# Patient Record
Sex: FEMALE | Race: WHITE
[De-identification: names, ages, dates, MRNs, and addresses within clinical notes are randomized per-mention and may not be internally consistent; named-entity substitution may affect disease eponyms.]

---

## 2017-02-17 ENCOUNTER — HOSPITAL ENCOUNTER (EMERGENCY)
Age: 22
Discharge: HOME | End: 2017-02-17
Payer: COMMERCIAL

## 2017-02-17 DIAGNOSIS — R09.81: ICD-10-CM

## 2017-02-17 DIAGNOSIS — H66.92: Primary | ICD-10-CM

## 2017-02-17 PROCEDURE — 99283 EMERGENCY DEPT VISIT LOW MDM: CPT

## 2017-02-17 PROCEDURE — 99284 EMERGENCY DEPT VISIT MOD MDM: CPT

## 2019-07-17 ENCOUNTER — HOSPITAL ENCOUNTER (EMERGENCY)
Dept: HOSPITAL 76 - ED | Age: 24
LOS: 1 days | Discharge: HOME | End: 2019-07-18
Payer: COMMERCIAL

## 2019-07-17 DIAGNOSIS — F17.200: ICD-10-CM

## 2019-07-17 DIAGNOSIS — O03.1: Primary | ICD-10-CM

## 2019-07-17 DIAGNOSIS — Z98.890: ICD-10-CM

## 2019-07-17 LAB
ALBUMIN DIAFP-MCNC: 4.1 G/DL (ref 3.2–5.5)
ALBUMIN/GLOB SERPL: 1.3 {RATIO} (ref 1–2.2)
ALP SERPL-CCNC: 57 IU/L (ref 42–121)
ALT SERPL W P-5'-P-CCNC: 19 IU/L (ref 10–60)
ANION GAP SERPL CALCULATED.4IONS-SCNC: 12 MMOL/L (ref 6–13)
AST SERPL W P-5'-P-CCNC: 16 IU/L (ref 10–42)
BASOPHILS NFR BLD AUTO: 0.1 10^3/UL (ref 0–0.1)
BASOPHILS NFR BLD AUTO: 0.7 %
BILIRUB BLD-MCNC: 1.4 MG/DL (ref 0.2–1)
BILIRUB UR QL CFM: NEGATIVE
BUN SERPL-MCNC: 16 MG/DL (ref 6–20)
CALCIUM UR-MCNC: 9 MG/DL (ref 8.5–10.3)
CHLORIDE SERPL-SCNC: 106 MMOL/L (ref 101–111)
CLARITY UR REFRACT.AUTO: (no result)
CO2 SERPL-SCNC: 22 MMOL/L (ref 21–32)
CREAT SERPLBLD-SCNC: 0.6 MG/DL (ref 0.4–1)
EOSINOPHIL # BLD AUTO: 0.2 10^3/UL (ref 0–0.7)
EOSINOPHIL NFR BLD AUTO: 1.4 %
ERYTHROCYTE [DISTWIDTH] IN BLOOD BY AUTOMATED COUNT: 14.7 % (ref 12–15)
GFRSERPLBLD MDRD-ARVRAT: 123 ML/MIN/{1.73_M2} (ref 89–?)
GLOBULIN SER-MCNC: 3.2 G/DL (ref 2.1–4.2)
GLUCOSE SERPL-MCNC: 87 MG/DL (ref 70–100)
GLUCOSE UR QL STRIP.AUTO: NEGATIVE MG/DL
HGB UR QL STRIP: 10.7 G/DL (ref 12–16)
KETONES UR QL STRIP.AUTO: 40 MG/DL
LIPASE SERPL-CCNC: 28 U/L (ref 22–51)
LYMPHOCYTES # SPEC AUTO: 2.4 10^3/UL (ref 1.5–3.5)
LYMPHOCYTES NFR BLD AUTO: 22.5 %
MCH RBC QN AUTO: 27.7 PG (ref 27–31)
MCHC RBC AUTO-ENTMCNC: 31.7 G/DL (ref 32–36)
MCV RBC AUTO: 87.6 FL (ref 81–99)
MONOCYTES # BLD AUTO: 0.6 10^3/UL (ref 0–1)
MONOCYTES NFR BLD AUTO: 6 %
NEUTROPHILS # BLD AUTO: 7.3 10^3/UL (ref 1.5–6.6)
NEUTROPHILS # SNV AUTO: 10.6 X10^3/UL (ref 4.8–10.8)
NEUTROPHILS NFR BLD AUTO: 69 %
NITRITE UR QL STRIP.AUTO: NEGATIVE
PDW BLD AUTO: 8.4 FL (ref 7.9–10.8)
PH UR STRIP.AUTO: 5.5 PH (ref 5–7.5)
PLATELET # BLD: 456 10^3/UL (ref 130–450)
PROT SPEC-MCNC: 7.3 G/DL (ref 6.7–8.2)
PROT UR STRIP.AUTO-MCNC: 100 MG/DL
RBC # UR STRIP.AUTO: (no result) /UL
RBC # URNS HPF: (no result) /HPF (ref 0–5)
RBC MAR: 3.86 10^6/UL (ref 4.2–5.4)
SODIUM SERPLBLD-SCNC: 140 MMOL/L (ref 135–145)
SP GR UR STRIP.AUTO: >=1.03 (ref 1–1.03)
SQUAMOUS URNS QL MICRO: (no result)
UROBILINOGEN UR QL STRIP.AUTO: (no result) E.U./DL
UROBILINOGEN UR STRIP.AUTO-MCNC: NEGATIVE MG/DL

## 2019-07-17 PROCEDURE — 85025 COMPLETE CBC W/AUTO DIFF WBC: CPT

## 2019-07-17 PROCEDURE — 99284 EMERGENCY DEPT VISIT MOD MDM: CPT

## 2019-07-17 PROCEDURE — 86901 BLOOD TYPING SEROLOGIC RH(D): CPT

## 2019-07-17 PROCEDURE — 84702 CHORIONIC GONADOTROPIN TEST: CPT

## 2019-07-17 PROCEDURE — 83690 ASSAY OF LIPASE: CPT

## 2019-07-17 PROCEDURE — 87086 URINE CULTURE/COLONY COUNT: CPT

## 2019-07-17 PROCEDURE — 76830 TRANSVAGINAL US NON-OB: CPT

## 2019-07-17 PROCEDURE — 36415 COLL VENOUS BLD VENIPUNCTURE: CPT

## 2019-07-17 PROCEDURE — 76856 US EXAM PELVIC COMPLETE: CPT

## 2019-07-17 PROCEDURE — 86900 BLOOD TYPING SEROLOGIC ABO: CPT

## 2019-07-17 PROCEDURE — 86850 RBC ANTIBODY SCREEN: CPT

## 2019-07-17 PROCEDURE — 80053 COMPREHEN METABOLIC PANEL: CPT

## 2019-07-17 PROCEDURE — 99283 EMERGENCY DEPT VISIT LOW MDM: CPT

## 2019-07-17 PROCEDURE — 81001 URINALYSIS AUTO W/SCOPE: CPT

## 2019-07-17 PROCEDURE — 81003 URINALYSIS AUTO W/O SCOPE: CPT

## 2019-07-18 VITALS — SYSTOLIC BLOOD PRESSURE: 101 MMHG | DIASTOLIC BLOOD PRESSURE: 71 MMHG

## 2019-07-18 NOTE — ULTRASOUND REPORT
Reason:  eval for RPC

Procedure Date:  07/17/2019   

Accession Number:  [id number] / [id number]                    

Procedure:  US  - Pelvic w/Transvaginal CPT Code:  

 

FULL RESULT:

 

 

EXAM:

PELVIC ULTRASOUND

 

EXAM DATE: 7/17/2019 11:40 PM.

 

CLINICAL HISTORY: D and C one week ago, persistent bleeding, question 

retained products

 

COMPARISON: None.

 

TECHNIQUE: Realtime transabdominal pelvic scan performed to identify the 

uterus and adnexa and as an overview of other pelvic structures, followed 

by transvaginal scan to provide greater detail of the uterus and adnexa, 

with static image documentation.

 

FINDINGS:

Uterus: 7.6 x 5.6 x 4.4 cm, volume 97 cc. Retroverted position. Normal 

overall size and echotexture.

Masses: None.

Endometrium: Focal thickening up to 18 mm. Vascularity in the region of 

focal thickening in the fundal myometrium, compatible with retained 

products.

Cervix: Unremarkable.

 

Right Ovary: 2.7 x 1.8 x 1.7 cm, volume 4 cc. Normal echotexture and 

blood flow.

Left Ovary: 2.5 x 1.4 x 1.3 cm, volume 2 cc. Normal echotexture and blood 

flow.

 

Free Fluid: None.

 

Other: None.

IMPRESSION: Focal thickening in the fundal endometrium, up to 18 mm, with 

associated vascularity, compatible with retained products of conception.

 

[facility]

## 2019-07-18 NOTE — ED PHYSICIAN DOCUMENTATION
PD HPI FEMALE 





- Stated complaint


Stated Complaint: FEM /BLEEDING POST OP





- Chief complaint


Chief Complaint: Abd Pain





- History obtained from


History obtained from: Patient





- History of Present Illness


Timing - onset: Today


Timing - duration: Hours


Timing - details: Abrupt onset, Still present


Associated symptoms: Pelvic pain, Vaginal bleeding


Contributing factors: Other (D&C on 19)


Similar symptoms before: Has not had sx before


Recently seen: Surgery





- Additional information


Additional information: 





24-year-old female has had a D&C done after a miscarriage on .  She 

states that initially the bleeding was well-controlled and stopped.  Her 

cramping was minimal.  She is subsequently developed significant bleeding 

passing clots that are silver dollar sized and a lot of cramping.





Review of Systems


Constitutional: denies: Fever


Eyes: denies: Decreased vision


Ears: denies: Ear pain


Nose: denies: Rhinorrhea / runny nose, Congestion


Throat: denies: Sore throat


Cardiac: denies: Chest pain / pressure


Respiratory: denies: Dyspnea, Cough


GI: reports: Abdominal Pain.  denies: Nausea, Vomiting


: reports: Vaginal bleeding, Other (pelvic cramping).  denies: Dysuria, 

Frequency





PD PAST MEDICAL HISTORY





- Past Medical History


Cardiovascular: None


Respiratory: None


Neuro: None


Endocrine/Autoimmune: None


GI: None


GYN: Miscarriage(s), Other


: Other


HEENT: None


Psych: Anxiety


Musculoskeletal: None


Derm: None


Other Past Medical History: ;





- Past Surgical History


Past Surgical History: Yes





- Present Medications


Home Medications: 


                                Ambulatory Orders











 Medication  Instructions  Recorded  Confirmed


 


Azithromycin [Zithromax] 250 mg PO DAILY #6 tablet 17 


 


Norethindrone-Ethinyl Estrad 0 mg PO DAILY 17





[Necon 0.5-35-28 Tablet]   














- Allergies


Allergies/Adverse Reactions: 


                                    Allergies











Allergy/AdvReac Type Severity Reaction Status Date / Time


 


No Known Drug Allergies Allergy   Verified 19 21:25














- Social History


Does the pt smoke?: No


Smoking Status: Current every day smoker


Does the pt drink ETOH?: Yes


Does the pt have substance abuse?: No


Substance Use and Type: Marijuana





- Immunizations


Immunizations are current?: Yes





- POLST


Patient has POLST: No





PD ED PE NORMAL





- Vitals


Vital signs reviewed: Yes (tachy )





- General


General: Alert and oriented X 3, No acute distress, Well developed/nourished





- HEENT


HEENT: Atraumatic, PERRL, EOMI





- Neck


Neck: Supple, no meningeal sign, No bony TTP





- Cardiac


Cardiac: RRR, No murmur





- Respiratory


Respiratory: No respiratory distress, Clear bilaterally





- Abdomen


Abdomen: Normal bowel sounds, Soft, Non tender, Non distended, No organomegaly





- Back


Back: No CVA TTP, No spinal TTP





- Derm


Derm: Normal color, Warm and dry, No rash





- Extremities


Extremities: No deformity, No edema





- Neuro


Neuro: Alert and oriented X 3, CNs 2-12 intact, No motor deficit, No sensory 

deficit, Normal speech


Eye Opening: Spontaneous


Motor: Obeys Commands


Verbal: Oriented


GCS Score: 15





- Psych


Psych: Normal mood, Normal affect





Results





- Vitals


Vitals: 


                               Vital Signs - 24 hr











  19





  21:21 01:33 02:30


 


Temperature 36.6 C 36.9 C 


 


Heart Rate 109 H 93 83


 


Respiratory 17  16





Rate   


 


Blood Pressure 111/73 111/66 101/71


 


O2 Saturation 99 100 98








                                     Oxygen











O2 Source                      Room air

















- Labs


Labs: 


                                Laboratory Tests











  19





  21:51 21:51 21:51


 


WBC  10.6  


 


RBC  3.86 L  


 


Hgb  10.7 L  


 


Hct  33.8 L  


 


MCV  87.6  


 


MCH  27.7  


 


MCHC  31.7 L  


 


RDW  14.7  


 


Plt Count  456 H  


 


MPV  8.4  


 


Neut # (Auto)  7.3 H  


 


Lymph # (Auto)  2.4  


 


Mono # (Auto)  0.6  


 


Eos # (Auto)  0.2  


 


Baso # (Auto)  0.1  


 


Absolute Nucleated RBC  0.00  


 


Nucleated RBC %  0.0  


 


Sodium   140 


 


Potassium   3.7 


 


Chloride   106 


 


Carbon Dioxide   22 


 


Anion Gap   12.0 


 


BUN   16 


 


Creatinine   0.6 


 


Estimated GFR (MDRD)   123 


 


Glucose   87 


 


Calcium   9.0 


 


Total Bilirubin   1.4 H 


 


AST   16 


 


ALT   19 


 


Alkaline Phosphatase   57 


 


Total Protein   7.3 


 


Albumin   4.1 


 


Globulin   3.2 


 


Albumin/Globulin Ratio   1.3 


 


Lipase   28 


 


HCG, Quant   


 


Urine Color   


 


Urine Clarity   


 


Urine pH   


 


Ur Specific Gravity   


 


Urine Protein   


 


Urine Glucose (UA)   


 


Urine Ketones   


 


Urine Occult Blood   


 


Urine Nitrite   


 


Urine Bilirubin   


 


Urine Urobilinogen   


 


Ur Leukocyte Esterase   


 


Urine RBC   


 


Urine WBC   


 


Ur Squamous Epith Cells   


 


Urine Bacteria   


 


Ur Microscopic Review   


 


Urine Culture Comments   


 


Blood Type    A POSITIVE


 


Antibody Screen    NEGATIVE














  19





  21:51 22:00


 


WBC  


 


RBC  


 


Hgb  


 


Hct  


 


MCV  


 


MCH  


 


MCHC  


 


RDW  


 


Plt Count  


 


MPV  


 


Neut # (Auto)  


 


Lymph # (Auto)  


 


Mono # (Auto)  


 


Eos # (Auto)  


 


Baso # (Auto)  


 


Absolute Nucleated RBC  


 


Nucleated RBC %  


 


Sodium  


 


Potassium  


 


Chloride  


 


Carbon Dioxide  


 


Anion Gap  


 


BUN  


 


Creatinine  


 


Estimated GFR (MDRD)  


 


Glucose  


 


Calcium  


 


Total Bilirubin  


 


AST  


 


ALT  


 


Alkaline Phosphatase  


 


Total Protein  


 


Albumin  


 


Globulin  


 


Albumin/Globulin Ratio  


 


Lipase  


 


HCG, Quant  200.26 


 


Urine Color   YELLOW


 


Urine Clarity   BLOODY


 


Urine pH   5.5


 


Ur Specific Gravity   >=1.030 H


 


Urine Protein   100 H


 


Urine Glucose (UA)   NEGATIVE


 


Urine Ketones   40 H


 


Urine Occult Blood   LARGE H


 


Urine Nitrite   NEGATIVE


 


Urine Bilirubin   NEGATIVE


 


Urine Urobilinogen   0.2 (NORMAL)


 


Ur Leukocyte Esterase   TRACE H


 


Urine RBC   TNTC H


 


Urine WBC   0-3


 


Ur Squamous Epith Cells   FEW Squamous


 


Urine Bacteria   Few


 


Ur Microscopic Review   INDICATED


 


Urine Culture Comments   INDICATED


 


Blood Type  


 


Antibody Screen  














- Rads (name of study)


  ** pelvic u/s


Radiology: Prelim report reviewed (Impression: Focal thickening in the fundal 

endometrium, up to 18 mm, with associated vascularity, compatible with retained 

products of conception.), EMP read indepedently, See rad report





PD MEDICAL DECISION MAKING





- ED course


Complexity details: reviewed results, re-evaluated patient, considered 

differential, d/w patient


ED course: 





24-year-old female with a miscarriage at 6 weeks has had a D&C and she is now 1 

week out from her procedure and is developed significant bleeding.  Here in the 

emergency department her bleeding has slowed down her cramping has slowed down 

and the ultrasound read shows potential products of retained conception  .  Dr. ash is consulted in the case and recommends the patient follow-up in his 

office on Friday and follow-up today if she has further excessive bleeding.





Departure





- Departure


Disposition: 01 Home, Self Care


Condition: Stable


Instructions:  Dilation and Curettage


Follow-Up: 


[facility] [Provider Group]


Comments: 


Follow-up on 2019 with [facility].  If you have excessive 

bleeding today follow-up with him today for follow-up here.


Discharge Date/Time: 19 02:40

## 2020-01-27 ENCOUNTER — HOSPITAL ENCOUNTER (EMERGENCY)
Age: 25
Discharge: HOME | End: 2020-01-27
Payer: SELF-PAY

## 2020-01-27 VITALS
DIASTOLIC BLOOD PRESSURE: 81 MMHG | RESPIRATION RATE: 18 BRPM | HEART RATE: 97 BPM | OXYGEN SATURATION: 100 % | TEMPERATURE: 98.42 F | SYSTOLIC BLOOD PRESSURE: 121 MMHG

## 2020-01-27 VITALS — SYSTOLIC BLOOD PRESSURE: 123 MMHG | DIASTOLIC BLOOD PRESSURE: 80 MMHG | HEART RATE: 91 BPM
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
## 2020-01-27 DIAGNOSIS — H11.32: Primary | ICD-10-CM

## 2020-01-27 DIAGNOSIS — S05.12XA: ICD-10-CM

## 2020-01-27 PROCEDURE — 99283 EMERGENCY DEPT VISIT LOW MDM: CPT

## 2020-01-27 NOTE — ED.EYEPROB
"HPI - Eye Problem
<ENIO Strange - Last Filed: 01/28/20 00:41>
General
Chief complaint: Eye Problems
Stated complaint: hit eye on face, couldn't see, bleeding
Time Seen by Provider: 01/27/20 16:58
Source: patient
Mode of arrival: Ambulatory
Limitations: no limitations
History of Present Illness
HPI Narrative: Is a 24-year-old female, smoker, who presents to ED with friends with chief complain of left eye injury.  Patient states she accidentally ran into the car door and hit her left eye.  Patient denies vision change but noticed redness 
and  bleeding  behind the lower eyelid.  Patient reports  could not see for the 1st 10-20 seconds  immediately after the direct trauma to the affected eye but this has resolved and denies visual changes or decreased vision.  Patient feels some 
irritation on affected eye when she moves her eyes.  Patient states her tetanus immunization is up to date at this time.

Review of Systems
<ENIO Strange - Last Filed: 01/28/20 00:41>
Review of Systems
Narrative: General: Denies fever, chills, fatigue, malaise, sweats.

HEENT:  See HPI

Respiratory: Denies dyspnea, cough, wheezing, hemoptysis, sputum.

Cardiovascular: Denies chest pain, palpitations, orthopnea, edema.

Gastrointestinal: Denies nausea, vomiting, abdominal pain, diarrhea, constipation, melena.

: Denies dysuria, frequency, incontinence, hematuria, urinary retention.

Musculoskeletal: Denies weakness, joint pain or bony pain.

Skin: Denies rash, skin lesions, or other.

Neurologic: Denies weakness, headache, numbness, change in speech, confusion, seizures, incoordination.

Psychiatric: No concerning psychosocial issues.

12-point review of systems is negative except for those stated above.


Patient History
<ENIO Strange - Last Filed: 01/28/20 00:41>
Medical History (Reviewed 01/27/20 @ 17:29 by ENIO Strange)

No significant past medical history (Acute)


Surgical History (Reviewed 01/27/20 @ 17:29 by ENIO Strange)

No pertinent past surgical history (Acute)


Social History (Reviewed 01/27/20 @ 17:29 by ENIO Strange)
Smoking Status:  Current every day smoker 


Smoking Status: Current every day smoker

Exam
<Pk ENIO Lacy - Last Filed: 01/28/20 00:41>
Narrative
Exam Narrative: General appearance: well developed, well nourished, in no acute distress.

Head: normocephalic, atraumatic, no scalp lesions, non-tender.

ENT:  Hearing grossly intact.  Nose without bleeding, purulent discharge, septal hematoma or deviation.  Turbinate without erythema or swelling.  Facial sinuses nontender to palpate.  Mucous membrane moist, no mucosal lesion.  Throat without 
erythema, tonsillar hypertrophy or exudate.  Uvula in midline, airway patent.  PERRAL, EOM intact, + subconjunctival hemorrhage on left lower eye.  Peripheral vision is intact and is able to read fingers held in front of her.  IOP with tonopen 
average 12.  No uptake noted with fluorescein in Left conjunctiva.  No hyphema noted.

Neck/Thyroid: neck supple, full range of motion, no visible masses or meningeal signs. No JVD, non-tender without lymphadenopathy. 

Skin: about 1-2 mm superfical abrasion was noted in Left inner corner and lateral eye.  no suspicious rashes, lesions over visible areas.  Warm and dry and appropriate color for ethnicity.

Heart: no clubbing, no cyanosis, no edema. S1 and S2 normal.  RRR w/o murmurs, clicks, or bruits.

Lungs: Breathing even and unlabored.  No stridor.  No accessory muscles used.  Able to speak in full sentences. 

Chest: normal shape and expansion.

Abdomen: non-obese, non-distended.

Neurologic: alert and oriented.  Cognitive exam, CNS and PNS grossly intact on informal exam.

Psych:  good eye contact, normal affect.  

Initial Vital Signs
Initial Vital Signs: Vital Signs

Temperature  98.5 F   01/27/20 14:59
Pulse Rate  97 H  01/27/20 14:59
Respiratory Rate  18   01/27/20 14:59
Blood Press
[id number]|2020-01-27 23:59:52|2020-01-27 23:59:52|ED.FEVER||||"HPI - Fever

## 2020-05-20 ENCOUNTER — HOSPITAL ENCOUNTER (OUTPATIENT)
Dept: HOSPITAL 76 - DI | Age: 25
Discharge: HOME | End: 2020-05-20
Attending: ADVANCED PRACTICE MIDWIFE
Payer: MEDICAID

## 2020-05-20 DIAGNOSIS — Z32.01: ICD-10-CM

## 2020-05-20 DIAGNOSIS — Z36.89: Primary | ICD-10-CM

## 2020-05-20 PROCEDURE — 76817 TRANSVAGINAL US OBSTETRIC: CPT

## 2020-05-20 PROCEDURE — 76801 OB US < 14 WKS SINGLE FETUS: CPT

## 2020-05-21 ENCOUNTER — HOSPITAL ENCOUNTER (OUTPATIENT)
Dept: HOSPITAL 76 - LAB.R | Age: 25
Discharge: HOME | End: 2020-05-21
Attending: ADVANCED PRACTICE MIDWIFE
Payer: MEDICAID

## 2020-05-21 DIAGNOSIS — Z34.90: Primary | ICD-10-CM

## 2020-05-21 LAB
AMPHET UR QL SCN: NEGATIVE
BENZODIAZ UR QL SCN: NEGATIVE
CLARITY UR REFRACT.AUTO: CLEAR
COCAINE UR-SCNC: NEGATIVE UMOL/L
GLUCOSE UR QL STRIP.AUTO: NEGATIVE MG/DL
KETONES UR QL STRIP.AUTO: NEGATIVE MG/DL
METHADONE UR QL SCN: NEGATIVE
METHAMPHET UR QL SCN: NEGATIVE
NITRITE UR QL STRIP.AUTO: NEGATIVE
OPIATES UR QL SCN: NEGATIVE
PH UR STRIP.AUTO: 7 PH (ref 5–7.5)
PROT UR STRIP.AUTO-MCNC: NEGATIVE MG/DL
RBC # UR STRIP.AUTO: NEGATIVE /UL
SP GR UR STRIP.AUTO: 1.02 (ref 1–1.03)
SQUAMOUS URNS QL MICRO: (no result)
UROBILINOGEN UR QL STRIP.AUTO: (no result) E.U./DL
UROBILINOGEN UR STRIP.AUTO-MCNC: NEGATIVE MG/DL
VOLATILE DRUGS POS SERPL SCN: (no result)

## 2020-05-21 PROCEDURE — 80306 DRUG TEST PRSMV INSTRMNT: CPT

## 2020-05-21 PROCEDURE — 81003 URINALYSIS AUTO W/O SCOPE: CPT

## 2020-05-21 PROCEDURE — 87086 URINE CULTURE/COLONY COUNT: CPT

## 2020-05-21 PROCEDURE — 81001 URINALYSIS AUTO W/SCOPE: CPT

## 2020-05-21 NOTE — ULTRASOUND REPORT
Reason:  PREGNANCY TEST POSTIVE

Procedure Date:  05/20/2020   

Accession Number:  [id number] / [id number]                    

Procedure:  US  - OB First Trimester CPT Code:  

 

***Final Report***

 

 

FULL RESULT:

 

 

EXAM:

FIRST TRIMESTER OBSTETRIC ULTRASOUND

(Less than 11 weeks)

 

EXAM DATE: 5/20/2020 07:07 PM.

 

CLINICAL HISTORY: PREGNANCY TEST POSITIVE.

LMP: Unknown.

 

COMPARISONS: None.

 

TECHNIQUE: Transabdominal and transvaginal ultrasound examination with 

static image documentation.

 

CLINICAL DATES:

EGA unknown.

 

ASSESSMENT:

Gestational Sac: Single intrauterine.  Mean gestational sac diameter: 38 

mm = 9 weeks 1 day.

Embryo: CRL (crown-rump length) 22 mm = 8 weeks 6 days.

Cardiac activity: 185 beats per minute.

Yolk sac: 4 mm.

Amniotic fluid: Not accurately assessed at this gestational age.

Early placenta: Not visible at this gestational age.

Other: There is a heterogeneous perigestational fluid collection 

measuring 3.7 x 0.7 cm.

 

MATERNAL STRUCTURES:

Uterus: Anteverted. Unremarkable.

Cervix: Closed.

Right Ovary/Adnexa: The ovary measures 2.6 x 1.7 x 2.4 cm, volume 5.5 cc. 

Unremarkable.

Left Ovary/Adnexa: The ovary measures 2.3 x 1.5 x 2.0 cm, volume 3.8 cc.  

Corpus luteum is present.

Free Fluid: None.

 

Other: None.

IMPRESSION:

1. Single viable intrauterine pregnancy at EGA 8 weeks 6 days with MICHAEL 

12/24/2020 based on crown-rump length.

2. Assigned dating is MICHAEL 12/24/2020 based on the current ultrasound.

3. There is a heterogeneous perigestational fluid collection measuring 

3.7 x 0.7 cm.

 

[facility]

## 2020-06-02 ENCOUNTER — HOSPITAL ENCOUNTER (OUTPATIENT)
Age: 25
End: 2020-06-02
Payer: COMMERCIAL

## 2020-06-02 DIAGNOSIS — Z34.01: Primary | ICD-10-CM

## 2020-06-02 DIAGNOSIS — Z3A.11: ICD-10-CM

## 2020-06-02 PROCEDURE — 76801 OB US < 14 WKS SINGLE FETUS: CPT

## 2020-06-02 PROCEDURE — 76817 TRANSVAGINAL US OBSTETRIC: CPT

## 2020-06-02 NOTE — DI.US.S_ITS
PROCEDURE:  US OB <= 14 WEEKS FETUS  
   
INDICATIONS:  DATES  
   
OUTSIDE/PRIOR DATING DATA:    
Last menstrual period (LMP): Unknown.    
LMP-based estimated date of delivery (FLACA): Unknown.    
First dating scan (date and location): 6/02/20.    
Estimated date of delivery (FLACA) from first dating scan: 12/20/20.    
   
TECHNIQUE:    
Real-time scanning was performed of the fetus and maternal pelvic organs, with image   
documentation.  Endovaginal scanning was also performed to better visualize the fetus and   
maternal ovaries.    
   
COMPARISON:  None.  
   
FINDINGS:    
   
Embryo:  A single live intrauterine pregnancy is seen. The measured heart rate is 173   
beats per minute. The crown-rump length measures 4.5 cm, corresponding to an estimated   
gestational age of 11 weeks 2 days.  It is too early for detailed anatomic assessment.    
By visual inspection, the amount of amniotic fluid is within normal limits. No   
significant findings of subchorionic/perigestational hemorrhage are seen.    
   
Measurement variability in dating:  +/- 4 weeks by LMP, +/- 7 days by mean sac diameter   
(use before 6 weeks gestation if crown-rump length not able to be measured), +/- 5 days   
by crown-rump length (up to 8 weeks 6 days gestation), +/- 7 days by crown-rump length   
(up to 13 weeks 6 days gestation).    
   
Maternal organs:  Ovaries are unremarkable, with an apparent left corpus luteum.  A small   
amount of free fluid is seen within the cul-de-sac. Limited images through the kidneys   
demonstrate no hydronephrosis.    
   
   
   
IMPRESSION:  A single live intrauterine pregnancy is seen.     
   
The estimated gestational age is 11 weeks 2 days, which corresponds to an estimated date   
of delivery of 12/20/20.  
   
   
   
Dictated by: Pola Sousa M.D. on 6/02/2020 at 10:56       
Approved by: Pola Sousa M.D. on 6/02/2020 at 10:58

## 2020-06-03 ENCOUNTER — HOSPITAL ENCOUNTER (OUTPATIENT)
Age: 25
End: 2020-06-03
Payer: COMMERCIAL

## 2020-06-03 DIAGNOSIS — Z34.01: Primary | ICD-10-CM

## 2020-06-03 LAB
ADD MANUAL DIFF / SLIDE REVIEW: NO
APPEARANCE UR: CLEAR
BILIRUBIN URINE UA: NEGATIVE
COLOR UR: YELLOW
GLUCOSE URINE UA: (no result) G/DL
HEMATOCRIT: 34.6 % (ref 36–46)
HEMOGLOBIN: 11.8 G/DL (ref 12–16)
HGB UR QL: NEGATIVE
KETONES URINE UA: NEGATIVE
LEUKOCYTE ESTERASE URINE UA: (no result)
LYMPHOCYTES # SPEC AUTO: 2200 /UL (ref 1100–4500)
MCV RBC: 85.1 FL (ref 80–100)
MEAN CORPUSCULAR HEMOGLOBIN: 29 PG (ref 26–34)
MEAN CORPUSCULAR HGB CONC: 34.1 % (ref 30–36)
NITRITE URINE UA: NEGATIVE
PH UR: 5.5 [PH] (ref 4.5–8)
PLATELET COUNT: 418 X10^3/UL (ref 150–400)
PROTEIN URINE UA: NEGATIVE
SP GR UR: 1.02 (ref 1–1.03)
URINE COMMENTS: (no result)
UROBILINOGEN UR QL: 1 E.U./DL

## 2020-06-03 PROCEDURE — 87389 HIV-1 AG W/HIV-1&-2 AB AG IA: CPT

## 2020-06-03 PROCEDURE — 36415 COLL VENOUS BLD VENIPUNCTURE: CPT

## 2020-06-03 PROCEDURE — 87077 CULTURE AEROBIC IDENTIFY: CPT

## 2020-06-03 PROCEDURE — 86803 HEPATITIS C AB TEST: CPT

## 2020-06-03 PROCEDURE — 86787 VARICELLA-ZOSTER ANTIBODY: CPT

## 2020-06-03 PROCEDURE — 87086 URINE CULTURE/COLONY COUNT: CPT

## 2020-06-03 PROCEDURE — 86901 BLOOD TYPING SEROLOGIC RH(D): CPT

## 2020-06-03 PROCEDURE — 86900 BLOOD TYPING SEROLOGIC ABO: CPT

## 2020-06-03 PROCEDURE — 86850 RBC ANTIBODY SCREEN: CPT

## 2020-06-03 PROCEDURE — 81003 URINALYSIS AUTO W/O SCOPE: CPT

## 2020-06-03 PROCEDURE — 81015 MICROSCOPIC EXAM OF URINE: CPT

## 2020-06-04 LAB
HBV SURFACE AG SERPL QL IA: NEGATIVE S/C
HCV AB SERPL QL IA: NEGATIVE S/C
HIV 1+2 AB+HIV1 P24 AG SERPL QL IA: NEGATIVE